# Patient Record
(demographics unavailable — no encounter records)

---

## 2024-10-31 NOTE — ASSESSMENT
[Diabetes Foot Care] : diabetes foot care [Long Term Vascular Complications] : long term vascular complications of diabetes [Carbohydrate Consistent Diet] : carbohydrate consistent diet [Importance of Diet and Exercise] : importance of diet and exercise to improve glycemic control, achieve weight loss and improve cardiovascular health [Exercise/Effect on Glucose] : exercise/effect on glucose [Hypoglycemia Management] : hypoglycemia management [Glucagon Use] : glucagon use [Ketone Testing] : ketone testing [Action and use of Insulin] : action and use of short and long-acting insulin [Self Monitoring of Blood Glucose] : self monitoring of blood glucose [Insulin Self-Administration] : insulin self-administration [Injection Technique, Storage, Sharps Disposal] : injection technique, storage, and sharps disposal [Sick-Day Management] : sick-day management [Retinopathy Screening] : Patient was referred to ophthalmology for retinopathy screening [Diabetic Medications] : Risks and benefits of diabetic medications were discussed [FreeTextEntry1] : T2DM, reasonably controlled - Toujeo 10 units at bedtime ; metformin 1000 mg bid, trulicity 1.5 mg qw - patient expressed a desire to lose weight and we discussed switching to Mounjaro; however, I discussed with her daughter Ann, who's against switching the medications at present. patient will discuss with her family - f/u cardio- consider switching from Amlodipine since it can contribute to leg swelling - will add Jardiance 10 mg (R+B) - can take acarbose ac large carb breakfast only - bedtime snack if FS < 120 - Gvoke - continue toprol, benicar/hct, lovastatin. - d/w pt adding fibrates- she declined, wants to concentrate on diet for now - thyroid US was prev advised- pt declined - cont extra vit D3 2,000 IU/day - f/u with PCP for monthly B12 injections- advised again on GI eval. Also, advised to monitor iron levels with pcp - f/u cardio. needs echo and consider adding furosemide - also advised on DXA, but she's reluctant at present. cont ca/D supplementations RTC 3 mos.

## 2024-10-31 NOTE — HISTORY OF PRESENT ILLNESS
[FreeTextEntry1] : F/u for diabetes management  *** Oct 31, 2024 ***  feels ok, no new c/o still with LACEY, seeing cardio next month on Toujeo 10 units at bedtime ; metformin 1000 mg bid, Trulicity 1.5 mg qw, Lovastatin 20 mg HS, metoprolol XL 50 mg, Valsartan--25 mg, Amlodipine 5 mg qd was added recently wearing Aguilar 2 Date of download:  10/31/2024  Diabetes Medications and Dosage: as above Indication for CGMS: verify a change in the treatment regimen, identify periods of hypoglycemia/ hyperglycemia.  Modal day report: pattern.  Pt with HYPO  0% of the time ( NONE below 54),  96% in target range Hyperglycemia:  4% elevation  Identified issues: carbohydrate counting dates analyzed: 10/18/2024 - 10/31/2024  *** Daniel 10, 2024 ***  doing well, denies new issues staying on Toujeo 10 units at bedtime ; metformin 1000 mg bid, trulicity 1.5 mg qw, Lovastatin 20 mg HS, metoprolol XL 50 mg, Valsartan--25 mg,  wearing aguilar Date of download:  01/10/2024  Diabetes Medications and Dosage: as above Indication for CGMS: verify a change in the treatment regimen, identify periods of hypoglycemia/ hyperglycemia.  Modal day report: pattern.  Pt with HYPO  0% of the time ( NONE below 54),  81% in target range Hyperglycemia:  19% elevation  Identified issues: carbohydrate counting dates analyzed:  12/28/2023- 01/10/2024  labs from 10/14/23-  a1c- 6.4%, TSH- 2.39, LDL- 59, TG- 170 H/H- 10.8/35 did not have colonoscopy yet. denies hematochezia   *** Aug 10, 2023 ***  feels well overall taking Toujeo 14 units at  bedtime ;  metformin 1000 mg bid, trulicity 1.5 mg qw , Lovastatin 20 mg HS, metoprolol XL 50 mg, Valsartan--25 mg,  wearing Aguilar 2 Date of download:  08/10/2023  Diabetes Medications and Dosage: as above Indication for CGMS: verify a change in the treatment regimen, identify periods of hypoglycemia/ hyperglycemia.  Modal day report: pattern.  Pt with HYPO  2% of the time ( NONE below 54),  72% in target range Hyperglycemia:  26% elevation  Identified issues: carbohydrate counting dates analyzed:  07/28/2023- 08/10/2023                                                     labs from 6/1/23- a1c- 6.9, TSH- 2.67, LDL- 62, B12- 300, creatinine - 1.02  *** Feb 16, 2023 ***  feels well, no new c/o Toujeo 16 units at  bedtime, metformin 1000 mg bid, trulicity 1.5 mg qw, Valsartan--25 (dose increased in october by Dr. Parks), Toprol XL 50 mg qd, Lipitor 40 mg HS receiving B2 monthly shots  no labs wearing aguilar Date of download:  02/16/2023  Diabetes Medications and Dosage: as above Indication for CGMS: verify a change in the treatment regimen, identify periods of hypoglycemia/ hyperglycemia.  Modal day report: pattern.  Pt with HYPO  0% of the time ( NONE below 54), 84 % in target range Hyperglycemia:  16% elevation  Identified issues: carbohydrate counting dates analyzed:  02/03/2023- 02/16/2023   *** Oct 12, 2022 ***  feels well, no new c/o Toujeo 16 units at  bedtime ;  metformin 1000 mg bid, trulicity 1.5mg qw  no recent labs  labs from 9/22/22- a1c- 6.3, creat- 0.86, B12- 215, LDL- 63, TG- 228, TSH- 2.77  started B12 monthly injections  Date of download:  10/12/2022  Diabetes Medications and Dosage: as above Indication for CGMS: verify a change in the treatment regimen, identify periods of hypoglycemia/ hyperglycemia.  Modal day report: pattern.  Pt with HYPO  0% of the time ( NONE below 54),  84% in target range Hyperglycemia:  16% elevation  Identified issues: carbohydrate counting dates analyzed: 09/29/2022  - 10/12/2022    *** Mar 09, 2022 ***  feels well taking  Toujeo 12 units at  bedtime ;  metformin 1000mg bid, trulicity 1.5mg qw , calcium/ ASA using aguilar 2 Date of download:  3/9/22 Diabetes Medications and Dosage: as above Indication for CGMS: verify a change in the treatment regimen, identify periods of hypoglycemia/ hyperglycemia.  Modal day report: pattern.  Pt with HYPO  0% of the time (NONE below 54),  92% in target range Hyperglycemia:  8% elevation  Identified issues: carbohydrate counting dates analyzed: 2/24/22-3/9/22   *** Phone visit  Jan 06, 2022 ***  taking Toujeo 10 units at  bedtime ;  metformin 1000mg bid, trulicity 1.5mg qw  reports  past week - fbs- 110's, p/dinner- 140's denies hypo's using Aguilar 2  *** Sep 29, 2021 ***  last visit more than 2 yrs ago- did not see doctors b/o the covid last week with an episode of severe am neuroglycopenia (FS- 40) (did not eat the day before). Off amaryl and acarbose for some time.  presently on  Toujeo 24 un HS (dose was lowered to 12 un yesterday when saw PCP),  metformin 1000mg bid, amaryl 4mg bid, trulicity 1.5mg, acarbose 100mg tid ac, toprol XL 50mg, benicar/HCT 40/25, lovastatin 40mg.  labs (9/28/21) - a1c- 5.1, creat- 1.22, ca- 9.2, f/amine- 214, TSH- 2.67  *** Jun 06,, 2019 ***  presently is on Toujeo 23 un HS,  metformin 1000mg bid, amaryl 4mg bid, trulicity 1.5mg, acarbose 100mg tid ac, toprol XL 50mg, benicar/HCT 40/25, lovastatin 40mg. log: FBS-100-129, still not checking ppg no hypo's s/p trigger finger injection today in the office 2hr PPG- 210 s/p recent PPM placement in 10/18. edema and SOB improved post PPM, but again with occas LACEY  labs (4/3/19)- a1c- 6.9, TSH- 2.34, TG- 362, LDL- 76, creat- 0.9   labs (12/10/18) - a1c-6.4, f/a- 237, urine m/alb-neg, creat- 0.9 labs (6/20/18)- a1c- 6.8, f/amine-289, TG-405 a1c- 6.8 <-- 7.0 <-- 6.5, TSH- 2.4,   LDL- 63 <-- 80, TG- 393, 25D -37 <-- 37.6   labs (3/29/17)- a1c- 6.8, f/amine- 237  a1c- 7.9 <--8.2 <--8.0 TG- 431 <-- 452 <-- 263   HISTORY OF PRESENT ILLNESS.    Patient was diagnosed with Diabetes Mellitus Type 2 more than 20 yrs ago.   Denies known complications of retinopathy, nephropathy, or neuropathy.   Reports  history  CAD (s/p CABG x 3 in 2005), HTN, dyslipidemia. Family history is negative for diabetes.    Lab review: 11/15- a1c-9.7, ppg glucose- 310, CO2- 20, creatinine- 0.83, lft's- wnl   ***  Last dilated eye exam - 1/23 Last podiatry visit -   0517 Last cardiology evaluation - 7/24, to see next month Last stress test in 02/16- per patient, wnl Last 2-D Echo - 2018

## 2024-11-20 NOTE — REASON FOR VISIT
[Hyperlipidemia] : hyperlipidemia [Hypertension] : hypertension [Follow-Up - Clinic] : a clinic follow-up of [FreeTextEntry2] : ppm HFpEF HTN HLD CAD

## 2024-11-20 NOTE — DISCUSSION/SUMMARY
[EKG obtained to assist in diagnosis and management of assessed problem(s)] : EKG obtained to assist in diagnosis and management of assessed problem(s) [FreeTextEntry1] : 86 year old woman with known CAD sp CABG in 2011, LHC in 2019 HFpEF HTN HLD here for followup  # CAD- CABG 2011 with no intervention on LHC in October 2019, EKG without changes. Continue present meds Continue ASA/Plavix Complaining of dyspnea Will check pharm nuclear stress test # PPM- FU per EP # HTN- On Toprol 50 and Valsartan-HCTZ, Continue present meds add norvasc 5 mg daily Change Valsartan 320 mg daily and add Lasix 40 mg twice  daily  #HLD- On Statin ,continue present meds # FU in 4 months

## 2024-11-20 NOTE — HISTORY OF PRESENT ILLNESS
[FreeTextEntry1] : Here for followup   TTE 4/2023: CONCLUSIONS: 1. Mitral annular calcification, otherwise normal mitral valve. Mild mitral regurgitation.  2. Normal left ventricular internal dimensions and wall thicknesses. 3. Mild segmental left ventricular systolic dysfunction.  Hypokinesis of the basal to mid inferior wall and basal inferoseptum.  Paradoxical septal wall motion. 4. Mild diastolic dysfunction (Stage I). 5. The right ventricle is not well visualized; grossly normal right ventricular systolic function. *** Compared with echocardiogram of 8/2/2019, no significant changes noted.  # CAD- CABG 2011 with no intervention on [facility] in October 2019, EKG without changes. Continue present meds Continue ASA/Plavix Complaining of dyspnea Will check pharm nuclear stress test # PPM- FU per EP # HTN- On Toprol 50 and Valsartan-HCTZ, Continue present meds add norvasc 5 mg daily Change Valsartan 320 mg daily and add Lasix 40 mg twice  daily  #HLD- On Statin ,continue present meds

## 2025-01-29 NOTE — DISCUSSION/SUMMARY
[FreeTextEntry1] : 86 year old woman with known CAD sp CABG in 2011, LHC in 2019 HFpEF HTN HLD here for followup  # CAD- CABG 2011 with no intervention on LHC in October 2019, EKG without changes. Continue present meds Continue ASA/Plavix Complaining of dyspnea Did not get pharm nuclear stress # PPM- FU per EP # HTN- On Toprol 50 and Valsartan-HCTZ, Continue present meds add norvasc 5 mg daily Changed Valsartan 320 mg daily and added Lasix 40 mg twice  daily  #HLD- On Statin ,continue present meds # FU in 4 months [EKG obtained to assist in diagnosis and management of assessed problem(s)] : EKG obtained to assist in diagnosis and management of assessed problem(s)

## 2025-01-29 NOTE — HISTORY OF PRESENT ILLNESS
[FreeTextEntry1] : Here for followup   TTE 4/2023: CONCLUSIONS: 1. Mitral annular calcification, otherwise normal mitral valve. Mild mitral regurgitation.  2. Normal left ventricular internal dimensions and wall thicknesses. 3. Mild segmental left ventricular systolic dysfunction.  Hypokinesis of the basal to mid inferior wall and basal inferoseptum.  Paradoxical septal wall motion. 4. Mild diastolic dysfunction (Stage I). 5. The right ventricle is not well visualized; grossly normal right ventricular systolic function. *** Compared with echocardiogram of 8/2/2019, no significant changes noted.  # CAD- CABG 2011 with no intervention on [facility] in October 2019, EKG without changes. Continue present meds Continue ASA/Plavix Complaining of dyspnea Did not get pharm nuclear stress # PPM- FU per EP # HTN- On Toprol 50 and Valsartan-HCTZ, Continue present meds add norvasc 5 mg daily Change Valsartan 320 mg daily and add Lasix 40 mg twice  daily  #HLD- On Statin ,continue present meds

## 2025-02-26 NOTE — ASSESSMENT
[Diabetes Foot Care] : diabetes foot care [Long Term Vascular Complications] : long term vascular complications of diabetes [Carbohydrate Consistent Diet] : carbohydrate consistent diet [Importance of Diet and Exercise] : importance of diet and exercise to improve glycemic control, achieve weight loss and improve cardiovascular health [Exercise/Effect on Glucose] : exercise/effect on glucose [Hypoglycemia Management] : hypoglycemia management [Glucagon Use] : glucagon use [Ketone Testing] : ketone testing [Action and use of Insulin] : action and use of short and long-acting insulin [Self Monitoring of Blood Glucose] : self monitoring of blood glucose [Insulin Self-Administration] : insulin self-administration [Injection Technique, Storage, Sharps Disposal] : injection technique, storage, and sharps disposal [Sick-Day Management] : sick-day management [Retinopathy Screening] : Patient was referred to ophthalmology for retinopathy screening [Diabetic Medications] : Risks and benefits of diabetic medications were discussed [FreeTextEntry1] : T2DM, reasonably controlled - Toujeo 10 units at bedtime ; metformin 1000 mg bid, trulicity 1.5 mg qw - patient expressed a desire to lose weight and we discussed switching to Mounjaro; however, I discussed with her daughter Ann, who's against switching the medications at present. patient will discuss with her family - f/u cardio- consider switching from Amlodipine since it can contribute to leg swelling - cont Jardiance 10 mg (R+B) - monitor K+ on lasix. retest today and if low again, will add K-Dur 10 meq and recheck K+ next week with PCP - can take acarbose ac large carb breakfast only - bedtime snack if FS < 120 - Gvoke - continue toprol, benicar/hct, lovastatin. - d/w pt adding fibrates- she declined, wants to concentrate on diet for now - thyroid US was prev advised- pt declined - cont extra vit D3 2,000 IU/day - f/u with PCP for monthly B12 injections- advised again on GI eval. Also, advised to monitor iron levels with pcp - f/u cardio. needs echo and consider adding furosemide - patient agreed to proceed with DXA- will refer. cont ca/D supplementations RTC 3-4 mos.

## 2025-02-26 NOTE — HISTORY OF PRESENT ILLNESS
[FreeTextEntry1] : F/u for diabetes management  *** Feb 26, 2025 ***  feels well overall still with LACEY, but stable staying on  Toujeo 10 units at bedtime ; Jardiance 10 mg qd, metformin 1000 mg bid, Trulicity 1.5 mg qw, Lovastatin 20 mg HS, metoprolol XL 50 mg, Valsartan--25 mg, Amlodipine 5 mg qd started on Lasix 40 mg  by cardio- helped a lot with leg swelling wearing Aguilar Date of download:  02/26/2025  Diabetes Medications and Dosage: as above Indication for CGMS: verify a change in the treatment regimen, identify periods of hypoglycemia/ hyperglycemia.  Modal day report: pattern.  Pt with HYPO  0% of the time ( NONE below 54),  95% in target range Hyperglycemia:  5% elevation  Identified issues: carbohydrate counting dates analyzed:  02/12/2025- 02/26/2025  labs from 01/7/25 - a1c- 6.6%, TSH- 2.33, LDL- 82, creat- 1.43, K- 3.3, calcium- 9.43  *** Oct 31, 2024 ***  feels ok, no new c/o still with LACEY, seeing cardio next month on Toujeo 10 units at bedtime ; metformin 1000 mg bid, Trulicity 1.5 mg qw, Lovastatin 20 mg HS, metoprolol XL 50 mg, Valsartan--25 mg, Amlodipine 5 mg qd was added recently wearing Aguilar 2 Date of download:  10/31/2024  Diabetes Medications and Dosage: as above Indication for CGMS: verify a change in the treatment regimen, identify periods of hypoglycemia/ hyperglycemia.  Modal day report: pattern.  Pt with HYPO  0% of the time ( NONE below 54),  96% in target range Hyperglycemia:  4% elevation  Identified issues: carbohydrate counting dates analyzed: 10/18/2024 - 10/31/2024  *** Daniel 10, 2024 ***  doing well, denies new issues staying on Toujeo 10 units at bedtime ; metformin 1000 mg bid, trulicity 1.5 mg qw, Lovastatin 20 mg HS, metoprolol XL 50 mg, Valsartan--25 mg,  wearing aguilar Date of download:  01/10/2024  Diabetes Medications and Dosage: as above Indication for CGMS: verify a change in the treatment regimen, identify periods of hypoglycemia/ hyperglycemia.  Modal day report: pattern.  Pt with HYPO  0% of the time ( NONE below 54),  81% in target range Hyperglycemia:  19% elevation  Identified issues: carbohydrate counting dates analyzed:  12/28/2023- 01/10/2024  labs from 10/14/23-  a1c- 6.4%, TSH- 2.39, LDL- 59, TG- 170 H/H- 10.8/35 did not have colonoscopy yet. denies hematochezia   *** Aug 10, 2023 ***  feels well overall taking Toujeo 14 units at  bedtime ;  metformin 1000 mg bid, trulicity 1.5 mg qw , Lovastatin 20 mg HS, metoprolol XL 50 mg, Valsartan--25 mg,  wearing Aguilar 2 Date of download:  08/10/2023  Diabetes Medications and Dosage: as above Indication for CGMS: verify a change in the treatment regimen, identify periods of hypoglycemia/ hyperglycemia.  Modal day report: pattern.  Pt with HYPO  2% of the time ( NONE below 54),  72% in target range Hyperglycemia:  26% elevation  Identified issues: carbohydrate counting dates analyzed:  07/28/2023- 08/10/2023                                                     labs from 6/1/23- a1c- 6.9, TSH- 2.67, LDL- 62, B12- 300, creatinine - 1.02  *** Feb 16, 2023 ***  feels well, no new c/o Toujeo 16 units at  bedtime, metformin 1000 mg bid, trulicity 1.5 mg qw, Valsartan--25 (dose increased in october by Dr. Parks), Toprol XL 50 mg qd, Lipitor 40 mg HS receiving B2 monthly shots  no labs wearing aguilar Date of download:  02/16/2023  Diabetes Medications and Dosage: as above Indication for CGMS: verify a change in the treatment regimen, identify periods of hypoglycemia/ hyperglycemia.  Modal day report: pattern.  Pt with HYPO  0% of the time ( NONE below 54), 84 % in target range Hyperglycemia:  16% elevation  Identified issues: carbohydrate counting dates analyzed:  02/03/2023- 02/16/2023   *** Oct 12, 2022 ***  feels well, no new c/o Toujeo 16 units at  bedtime ;  metformin 1000 mg bid, trulicity 1.5mg qw  no recent labs  labs from 9/22/22- a1c- 6.3, creat- 0.86, B12- 215, LDL- 63, TG- 228, TSH- 2.77  started B12 monthly injections  Date of download:  10/12/2022  Diabetes Medications and Dosage: as above Indication for CGMS: verify a change in the treatment regimen, identify periods of hypoglycemia/ hyperglycemia.  Modal day report: pattern.  Pt with HYPO  0% of the time ( NONE below 54),  84% in target range Hyperglycemia:  16% elevation  Identified issues: carbohydrate counting dates analyzed: 09/29/2022  - 10/12/2022    *** Mar 09, 2022 ***  feels well taking  Toujeo 12 units at  bedtime ;  metformin 1000mg bid, trulicity 1.5mg qw , calcium/ ASA using aguilar 2 Date of download:  3/9/22 Diabetes Medications and Dosage: as above Indication for CGMS: verify a change in the treatment regimen, identify periods of hypoglycemia/ hyperglycemia.  Modal day report: pattern.  Pt with HYPO  0% of the time (NONE below 54),  92% in target range Hyperglycemia:  8% elevation  Identified issues: carbohydrate counting dates analyzed: 2/24/22-3/9/22   *** Phone visit  Jan 06, 2022 ***  taking Toujeo 10 units at  bedtime ;  metformin 1000mg bid, trulicity 1.5mg qw  reports  past week - fbs- 110's, p/dinner- 140's denies hypo's using Aguilar 2  *** Sep 29, 2021 ***  last visit more than 2 yrs ago- did not see doctors b/o the covid last week with an episode of severe am neuroglycopenia (FS- 40) (did not eat the day before). Off amaryl and acarbose for some time.  presently on  Toujeo 24 un HS (dose was lowered to 12 un yesterday when saw PCP),  metformin 1000mg bid, amaryl 4mg bid, trulicity 1.5mg, acarbose 100mg tid ac, toprol XL 50mg, benicar/HCT 40/25, lovastatin 40mg.  labs (9/28/21) - a1c- 5.1, creat- 1.22, ca- 9.2, f/amine- 214, TSH- 2.67  *** Jun 06,, 2019 ***  presently is on Toujeo 23 un HS,  metformin 1000mg bid, amaryl 4mg bid, trulicity 1.5mg, acarbose 100mg tid ac, toprol XL 50mg, benicar/HCT 40/25, lovastatin 40mg. log: FBS-100-129, still not checking ppg no hypo's s/p trigger finger injection today in the office 2hr PPG- 210 s/p recent PPM placement in 10/18. edema and SOB improved post PPM, but again with occas LACEY  labs (4/3/19)- a1c- 6.9, TSH- 2.34, TG- 362, LDL- 76, creat- 0.9   labs (12/10/18) - a1c-6.4, f/a- 237, urine m/alb-neg, creat- 0.9 labs (6/20/18)- a1c- 6.8, f/amine-289, TG-405 a1c- 6.8 <-- 7.0 <-- 6.5, TSH- 2.4,   LDL- 63 <-- 80, TG- 393, 25D -37 <-- 37.6   labs (3/29/17)- a1c- 6.8, f/amine- 237  a1c- 7.9 <--8.2 <--8.0 TG- 431 <-- 452 <-- 263   HISTORY OF PRESENT ILLNESS.    Patient was diagnosed with Diabetes Mellitus Type 2 more than 20 yrs ago.   Denies known complications of retinopathy, nephropathy, or neuropathy.   Reports  history  CAD (s/p CABG x 3 in 2005), HTN, dyslipidemia. Family history is negative for diabetes.    Lab review: 11/15- a1c-9.7, ppg glucose- 310, CO2- 20, creatinine- 0.83, lft's- wnl   ***  Last dilated eye exam - 1/23 Last podiatry visit -   0517 Last cardiology evaluation - 11/24 Last stress test in 02/16- per patient, wnl Last 2-D Echo - 2018

## 2025-04-30 NOTE — DISCUSSION/SUMMARY
[EKG obtained to assist in diagnosis and management of assessed problem(s)] : EKG obtained to assist in diagnosis and management of assessed problem(s) [FreeTextEntry1] : 86 year old woman with known CAD sp CABG in 2011, LHC in 2019 HFpEF HTN HLD here for followup  # CAD- CABG 2011 with no intervention on LHC in October 2019, EKG without changes. Continue present meds Continue ASA/Plavix # PPM- FU per EP # HTN- On Toprol 50 and Valsartan Continue present meds added norvasc 5 mg daily Change Valsartan 320 mg daily and add Lasix 40 mg twice  daily  #HLD- On Statin ,continue present meds #FU in 4 months 3

## 2025-04-30 NOTE — HISTORY OF PRESENT ILLNESS
[FreeTextEntry1] : Here for followup  Edema greatly improved  TTE 4/2023: CONCLUSIONS: 1. Mitral annular calcification, otherwise normal mitral valve. Mild mitral regurgitation.  2. Normal left ventricular internal dimensions and wall thicknesses. 3. Mild segmental left ventricular systolic dysfunction.  Hypokinesis of the basal to mid inferior wall and basal inferoseptum.  Paradoxical septal wall motion. 4. Mild diastolic dysfunction (Stage I). 5. The right ventricle is not well visualized; grossly normal right ventricular systolic function. *** Compared with echocardiogram of 8/2/2019, no significant changes noted.  # CAD- CABG 2011 with no intervention on [facility] in October 2019, EKG without changes. Continue present meds Continue ASA/Plavix # PPM- FU per EP # HTN- On Toprol 50 and Valsartan Continue present meds added norvasc 5 mg daily Change Valsartan 320 mg daily and add Lasix 40 mg twice  daily  #HLD- On Statin ,continue present meds

## 2025-07-22 NOTE — REASON FOR VISIT
Chief complaint:   Chief Complaint   Patient presents with   • Dizziness       Vitals:  Visit Vitals  /72 (BP Location: RUE - Right upper extremity, Patient Position: Sitting, Cuff Size: Regular)   Pulse 94   Temp 98.1 °F (36.7 °C) (Oral)   Resp 14   Wt 50.8 kg (112 lb)   LMP  (LMP Unknown)   SpO2 97%   BMI 21.51 kg/m²       HISTORY OF PRESENT ILLNESS     Urinary pain is decreasing quite a bite, urgency/frequency persists.   Incontinence has resolved.   Has chronic tremors, more so today.   Was started on Wellbutrin about 2 weeks ago, stopped due to possible unsteadiness with no change in unsteadiness.   Patient present with friend.      Dizziness  This is a recurrent problem. Progression since onset: worse yesterday and today, feels balance is off. Using walker due to feeling unsteady. Pertinent negatives include no chest pain, nausea, vomiting or weakness.       Other significant problems:  Patient Active Problem List    Diagnosis Date Noted   • Hypercalcemia 06/26/2025     Priority: Low   • Compression fracture of L1 lumbar vertebra  (CMD) 06/26/2025     Priority: Low   • Adrenal nodule (CMD) 06/26/2025     Priority: Low   • Restless leg 12/02/2024     Priority: Low   • Vitamin D deficiency 12/02/2024     Priority: Low   • Insomnia 12/02/2024     Priority: Low   • MDD (major depressive disorder) 12/02/2024     Priority: Low   • Acute bilateral low back pain without sciatica 12/01/2024     Priority: Low   • Pathological fracture due to osteoporosis 08/27/2024     Priority: Low   • Primary hypertension 09/18/2023     Priority: Low   • Severe recurrent major depression without psychotic features  (CMD) 04/07/2023     Priority: Low   • Recurrent major depressive disorder, in partial remission (CMD) 06/14/2022     Priority: Low   • Gastroesophageal reflux disease with esophagitis without hemorrhage 05/10/2021     Priority: Low   • Osteoporosis with pathological fracture, with routine healing, subsequent  encounter 01/24/2018     Priority: Low   • B12 deficiency 09/13/2017     Priority: Low   • Hyperglycemia 07/05/2017     Priority: Low   • Chronic fatigue 03/15/2016     Priority: Low   • Anemia 02/27/2013     Priority: Low   • Calculus of kidney 08/06/2012     Priority: Low   • Right ureteral stone 08/03/2012     Priority: Low   • Hyperlipidemia 05/11/2012     Priority: Low   • Irritable bowel syndrome 11/15/2011     Priority: Low   • Cholesteatoma 11/23/2010     Priority: Low   • Hypothyroidism 09/01/2010     Priority: Low   • Bipolar I disorder  (CMD) 06/05/2009     Priority: Low   • Generalized anxiety disorder 06/05/2009     Priority: Low       PAST MEDICAL, FAMILY AND SOCIAL HISTORY     Medications:  Current Outpatient Medications   Medication Sig Dispense Refill   • buPROPion (WELLBUTRIN) 75 MG tablet TAKE 0.5 TABLETS BY MOUTH AS DIRECTED IN AM AND AFTER 7 DAYS MAY INCREASE TO 0.5 TABLETS IN AM AND NOON. MAY STOP IF INCREASED ANXIETY     • fluconazole (DIFLUCAN) 200 MG tablet Take 1 tablet by mouth daily for 14 days. 14 tablet 0   • moxifloxacin (VIGAMOX) 0.5 % ophthalmic solution INSTILL 1 DROP TO SURGICAL EYE FOUR TIMES DAILY FOR 7 DAYS (Patient not taking: Reported on 7/22/2025)     • prednisoLONE acetate (PRED FORTE) 1 % ophthalmic suspension [None received] (Patient not taking: Reported on 7/22/2025)     • TRIAMTERENE-HCTZ PO Take by mouth 1 day a week. Takes 1 day  per week (Patient not taking: Reported on 7/22/2025)     • VITAMIN D, CHOLECALCIFEROL, PO Takes one tablet daily     • estrogens, conjugated (PREMARIN) 0.625 MG tablet Take 1 tablet by mouth daily. 90 tablet 3   • diazePAM (VALIUM) 2 MG tablet Take 1 tablet by mouth nightly. 30 tablet 5   • diazePAM (VALIUM) 5 MG tablet Take 1 tablet by mouth every morning. 30 tablet 5   • donepezil (ARICEPT) 10 MG tablet Take 1 tablet by mouth nightly. 90 tablet 3   • lamoTRIgine (LaMICtal) 150 MG tablet Take 1 tablet by mouth in the morning and 1 tablet in  the evening. (Patient not taking: Reported on 7/22/2025) 90 tablet 3   • levothyroxine 50 MCG tablet Take 1 tablet by mouth daily. 90 tablet 1   • pantoprazole (PROTONIX) 40 MG tablet Take 1 tablet by mouth daily. 90 tablet 1   • estradiol (ESTRACE) 0.1 MG/GM vaginal cream Place 1 g vaginally 2 days a week. (Patient not taking: Reported on 7/22/2025) 42.5 g 1   • acetaminophen (TYLENOL) 500 MG tablet Take 1,000 mg by mouth every 6 hours as needed.     • DULoxetine (CYMBALTA) 30 MG capsule Take 30 mg by mouth 4 times daily.     • gabapentin (NEURONTIN) 100 MG capsule Take 100 mg by mouth in the morning and 100 mg at noon and 100 mg in the evening.     • memantine (NAMENDA) 10 MG tablet Take 10 mg by mouth in the morning and 10 mg in the evening.     • polyethylene glycol (MIRALAX) 17 GM/SCOOP powder Take 17 g by mouth daily as needed.     • potassium citrate ER 10 MEQ (1080 MG) Tab CR Take 120 mEq by mouth in the morning and 120 mEq in the evening. Takes about 4 a day     • pramipexole (MIRAPEX) 0.25 MG tablet Take 0.25 mg by mouth daily.       No current facility-administered medications for this visit.       Allergies:  ALLERGIES:   Allergen Reactions   • Aspirin GI UPSET, Nausea & Vomiting and Other (See Comments)     GI upset // Stomach discomfort   • Cyclobenzaprine HIVES, RASH, VISUAL DISTURBANCE, PRURITUS and Other (See Comments)     myalgia   • Rosuvastatin WEAKNESS, MYALGIA and Other (See Comments)     Muscle pain (tolerates pravastatin, Pravachol)      • Amoxicillin NAUSEA and Nausea & Vomiting   • Ciprofloxacin NAUSEA and Nausea & Vomiting     Not tolerated well.  Pt is unsure of reaction, thinks it caused some sort of skin reaction  (Has tolerated levofloxacin)   • Codeine NAUSEA and Nausea & Vomiting     \"Sick to stomach\"   \"Sick to stomach\"   • Erythromycin GI UPSET, NAUSEA and Nausea & Vomiting   • Ibuprofen GI UPSET, NAUSEA, Nausea & Vomiting and Other (See Comments)     GI upset      • Opioid  Analgesics Nausea & Vomiting     Sick to stomach (Abstracted from [facility] Everywhere)    \"Sick to stomach\"   • Sulfa Antibiotics NAUSEA and Nausea & Vomiting     \"Sick to stomach\"       Past Medical  History/Surgeries:  Past Medical History:   Diagnosis Date   • Anxiety    • Depressive disorder    • Essential tremor    • Fracture     L1 burst fracture   • GERD (gastroesophageal reflux disease)    • IBS (irritable bowel syndrome)    • Kidney stones    • Osteoporosis    • Sacrococcygeal disorders, not elsewhere classified    • Spinal stenosis     lumbar region with neurogenic claudication   • Spondylosis     lumbosacral region   • Thyroid disease        Past Surgical History:   Procedure Laterality Date   • Appendectomy     • Cholecystectomy     • Cholesteatoma excision     • Hysterectomy     • Oophorectomy  1981   • Total abdominal hysterectomy         Family History:  Family History   Problem Relation Age of Onset   • Diabetes Mother    • Coronary Artery Disease Mother    • Dementia/Alzheimers Mother    • Depression Mother    • Hypertension Mother    • Cancer, Lung Mother    • Cancer, Esophageal Father        Social History:  Social History     Tobacco Use   • Smoking status: Never     Passive exposure: Never   • Smokeless tobacco: Never   Substance Use Topics   • Alcohol use: Never       REVIEW OF SYSTEMS     Review of Systems   Constitutional:  Negative for appetite change.   HENT:  Negative for sinus pressure.    Respiratory:  Negative for shortness of breath.    Cardiovascular:  Negative for chest pain and palpitations.   Gastrointestinal:  Negative for diarrhea, nausea and vomiting.   Neurological:  Positive for light-headedness. Negative for syncope and weakness.   Psychiatric/Behavioral:  Negative for confusion (no change per friend).        PHYSICAL EXAM     Physical Exam  Vitals and nursing note reviewed.   Constitutional:       General: She is not in acute distress.      Appearance: She is well-developed. She is not diaphoretic.   Cardiovascular:      Rate and Rhythm: Normal rate.   Pulmonary:      Effort: Pulmonary effort is normal. No respiratory distress.      Breath sounds: No wheezing, rhonchi or rales.   Abdominal:      Palpations: Abdomen is soft.      Tenderness: There is no abdominal tenderness. There is no right CVA tenderness, left CVA tenderness, guarding or rebound.   Skin:     General: Skin is warm and dry.   Neurological:      Mental Status: She is alert and oriented to person, place, and time.      GCS: GCS eye subscore is 4. GCS verbal subscore is 5. GCS motor subscore is 6.      Cranial Nerves: No facial asymmetry.      Motor: Tremor present. No weakness, abnormal muscle tone or pronator drift.      Coordination: Romberg sign negative.      Comments: Bilateral tremors of arms and hands noted     Psychiatric:         Behavior: Behavior normal.         Thought Content: Thought content normal.         Judgment: Judgment normal.       ASSESSMENT/PLAN   Kavita was seen today for dizziness.    Diagnoses and all orders for this visit:     (genitourinary) symptoms  -     Cancel: Urine, Bacterial Culture  -     SERVICE TO UROLOGY  -     CBC with Automated Differential  -     Comprehensive Metabolic Panel  -     Urine, Bacterial Culture    Recurrent UTI  -     SERVICE TO UROLOGY    Dizziness  -     CBC with Automated Differential  -     Comprehensive Metabolic Panel  Patient agreeable to above.  She would like to stop the diflucan as she feels that is making her lightheadedness worse.   Will recheck a urine culture.  To follow up with Urology and PCP.  Referral placed per patient request.  When to seek ER care reviewed.  Tammy L Schladweiler, NP     [Hyperlipidemia] : hyperlipidemia [Hypertension] : hypertension [Follow-Up - Clinic] : a clinic follow-up of [FreeTextEntry2] : ppm HFpEF HTN HLD CAD